# Patient Record
(demographics unavailable — no encounter records)

---

## 2024-10-16 NOTE — REASON FOR VISIT
[FreeTextEntry2] : follow up right shoulder. Reports significant improvement with MDP/ PT. did not take cyclobenzaprine

## 2024-10-16 NOTE — DISCUSSION/SUMMARY
[de-identified] : continue HEP fu 6-8 weeks  ----------------------------------------------------------------------------  All relevant imaging studies pertinent to today's visit, including x-rays, MRI's and/or other advanced imaging studies (CT/etc) were independently interpreted and reviewed with the patient as needed. Implications of the studies together with the patient's clinical picture were discussed to formulate a working diagnosis and management options were detailed.  The patient and/or guardian was advised of the diagnosis.  The natural history of the pathology was explained in full. All questions were answered.  The risks and benefits of conservative and interventional treatment alternatives were explained to the patient  The patient and/or guardian was advised if any advanced diagnostic/imaging study (MRI/CT/etc) is ordered to evaluate potential pathology in the affected area(s), they should follow up in the office to review the results of the study and determine further management that may be indicated.

## 2024-10-16 NOTE — IMAGING
[de-identified] :   ----------------------------------------------------------------------------   Right shoulder exam:   Skin: no significant pertinent finding Inspection: no obvious deformity, no obvious masses, no swelling, no effusion, no atrophy ROM:    FF: 155a 170p    ER: 70    IR: T12 Tenderness:    (neg) Anterior/Biceps:    (neg) Posterior    (neg) Lateral    (mild) Trapezius    (mild) Scapula    (mild) AC joint    (neg) Crepitus with ROM Stability:    (neg) Translation    (neg) Apprehension    (neg) Clicking Additional tests:    (min) Neer's    (min) Hawkin's    (neg) Esqueda's    (neg) Speed    (neg) Cross chest adduction Strength:    FF: 5/5    ER: 5/5    IR: 5/5    Biceps: 5/5    Triceps: 5/5    Distal: 5/5 Neuro: In tact to light touch throughout Vascularity: Extremity warm and well perfused

## 2024-10-16 NOTE — HISTORY OF PRESENT ILLNESS
[de-identified] : This is Ms. MINE ROBERTSON  a 61 year old female who comes in today complaining of right shoulder pain since passing out on 9/19/24.  She was seen at [facility].   using nsaids without relief. pain is worsening. +night pain. pain is indicated as severe.  no previous shoulder problems [] : no [FreeTextEntry3] : 9/19/24 [de-identified] : xray [de-identified] : physical therapy

## 2025-01-02 NOTE — HISTORY OF PRESENT ILLNESS
[de-identified] : neck pain;  plus facial numbness, both arms and both legs and torso are numb;  was admitted for this to the hospital;  an MRI was planned but they could not 'turn off the spinal cord stimulator' so discharged her to have the MRI done as an outpatient;  here to get the MRI ; no taste, itching, hair falling out;  'odd and nonspecific things'; not able to swallow;  blurry vision; PMD rec: rheum eval given sjogen's and fibromyalgia;  two docs saw her and did not equate the current symptoms with a recent weight loss injection [] : no [FreeTextEntry5] : Neck pain since 12/2024, No injury. States she takes Weight lost injs, stated she stated to have an allergic reaction. Having numbness and burning sensation.

## 2025-01-02 NOTE — PHYSICAL EXAM
[Normal Mood and Affect] : normal mood and affect [Oriented] : oriented [Able to Communicate] : able to communicate [No Respiratory Distress] : no respiratory distress [Rotation to left] : rotation to left [Rotation to right] : rotation to right [] : negative [place]

## 2025-01-02 NOTE — ASSESSMENT
[FreeTextEntry1] : constellation of symptoms;  warrants MRI cspine;  soon;  not STAT but sooner since the workup has thus far been inconclusive;  if it is a spinal disorder then it will be cervical;  going to set her up for the MRi and give her a call;  as long as there is no spinal cord compression then I would not attribute the multiple symptoms to her spinal cord; and care going forward would be directed by PMD/rheum/.PM&R